# Patient Record
Sex: MALE | Race: WHITE | NOT HISPANIC OR LATINO | URBAN - METROPOLITAN AREA
[De-identification: names, ages, dates, MRNs, and addresses within clinical notes are randomized per-mention and may not be internally consistent; named-entity substitution may affect disease eponyms.]

---
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
## 2023-06-09 ENCOUNTER — OFFICE VISIT (OUTPATIENT)
Dept: GASTROENTEROLOGY | Facility: CLINIC | Age: 18
End: 2023-06-09
Payer: COMMERCIAL

## 2023-06-09 VITALS
HEIGHT: 68 IN | BODY MASS INDEX: 27.19 KG/M2 | WEIGHT: 179.4 LBS | SYSTOLIC BLOOD PRESSURE: 132 MMHG | DIASTOLIC BLOOD PRESSURE: 77 MMHG | HEART RATE: 64 BPM

## 2023-06-09 DIAGNOSIS — R14.0 BLOATING: Primary | ICD-10-CM

## 2023-06-09 PROCEDURE — 99213 OFFICE O/P EST LOW 20 MIN: CPT | Performed by: INTERNAL MEDICINE

## 2023-06-09 NOTE — PROGRESS NOTES
"[facility] - Outpatient Follow-up Note  Makayla Kelsey 21 y o  male MRN: [id number]  Encounter: [id number]          ASSESSMENT AND PLAN:      1  Bloating  Symptoms likely due to small intestinal bacterial overgrowth versus postinfectious IBS  We will evaluate with SIBO breath test and consider trial of Xifaxan either way  Contingency might include further evaluation with CT scan or colonoscopy though these do not seem necessary at present  Discussed low FODMAP diet    - Small intestinal bacterial overgrowth    ______________________________________________________________________    SUBJECTIVE: Patient seen previously in August 2022 with symptoms suggestive of postinfectious IBS  Work-up at that time including fecal calprotectin, TSH, Giardia antigen, pancreatic elastase all normal   Diarrhea has improved but now reports significant abdominal gas and bloating primarily in the morning when first waking and often relieved with defecation  Does not seem to be associated with any particular foods  He has tried probiotics without improvement      REVIEW OF SYSTEMS:    ROS       Historical Information   History reviewed  No pertinent past medical history  Past Surgical History:   Procedure Laterality Date   • CYST REMOVAL      tailbone     Social History   Social History     Substance and Sexual Activity   Alcohol Use Not Currently     Social History     Substance and Sexual Activity   Drug Use Never     Social History     Tobacco Use   Smoking Status Never   Smokeless Tobacco Never     Family History   Family history unknown: Yes       Meds/Allergies     No current outpatient medications on file  No Known Allergies        Objective     Blood pressure 132/77, pulse 64, height 5' 8\" (1 727 m), weight 81 4 kg (179 lb 6 4 oz)  Body mass index is 27 28 kg/m²  PHYSICAL EXAM:      Physical Exam     Lab Results:   No visits with results within 1 Day(s) from this visit     Latest known " visit with results is:   Lab on 08/04/2022   Component Date Value   • Giardia Ag, Stl 08/04/2022 Negative    • Pancreatic Elastase-1 08/04/2022 263    • Calprotectin 08/04/2022 53          Radiology Results:   No results found

## 2023-06-13 ENCOUNTER — TELEPHONE (OUTPATIENT)
Dept: GASTROENTEROLOGY | Facility: CLINIC | Age: 18
End: 2023-06-13

## 2023-06-15 ENCOUNTER — OFFICE VISIT (OUTPATIENT)
Dept: GASTROENTEROLOGY | Facility: CLINIC | Age: 18
End: 2023-06-15
Payer: COMMERCIAL

## 2023-06-15 DIAGNOSIS — R14.0 BLOATING: Primary | ICD-10-CM

## 2023-06-15 PROCEDURE — 91065 BREATH HYDROGEN/METHANE TEST: CPT | Performed by: INTERNAL MEDICINE

## 2023-06-15 NOTE — PROGRESS NOTES
[facility]       Bacterial Overgrowth Analytical Record    Breanna Lainez 21 y o  male MRN: [id number]      Date of Test: 06/13/2023    Substrate Given: Lactulose    Ordering Provider: Dr Amy Cruz Assistant: Ryan Cook    Symptoms: bloating    The patient presents for bacterial overgrowth testing  Patient fasted overnight  Baseline readings obtained  Breath test performed every 20 min for a total of 3 hr    Sample Clock Time ppmH2 ppmCH4 Co2% Oscar   Baseline   07:40 2 3 4 8 1 14   #1  20 minutes 08:00 2 4 4 6 1 19   #2  40 minutes 08:20 3 4 5 2 1 05   #3  60 minutes 08:40 18 7 5 2 1 05   #4  80 minutes 09:00 39 9 5 4 1 01   #5  100 minutes 09:20 48 8 5 2 1 05   #6  120 minutes 09:40 41 9 4 9 1 12   #7  140 minutes 10:10 31 9 5 1 1 07   #8  160 minutes 10:20 37 7 5 2 1 05   #9  180 minutes 10:40 19 7 5 1 1 07       Physician interpretation: Breath test is positive for small intestinal bacterial overgrowth but not intestinal methanogen overgrowth  Will forward to Dr Ewa Clark to discuss next steps with patient

## 2023-07-05 ENCOUNTER — TELEPHONE (OUTPATIENT)
Age: 18
End: 2023-07-05

## 2023-07-05 NOTE — TELEPHONE ENCOUNTER
pts father called to inquire on results of SIBO test. I relayed positive results. They are looking for next steps? Medications?

## 2023-07-07 NOTE — TELEPHONE ENCOUNTER
Pt called again. He is wondering if he needs to start any medications for his positive SIBO test.  Please advise.

## 2023-07-07 NOTE — TELEPHONE ENCOUNTER
Pt called regarding medication he needs to take from the positive SIBO test. Pt would like a call when prescription has been sent over to the pharmacy. Thank you.

## 2023-07-10 ENCOUNTER — TELEPHONE (OUTPATIENT)
Age: 18
End: 2023-07-10

## 2023-07-10 DIAGNOSIS — R19.7 DIARRHEA, UNSPECIFIED TYPE: Primary | ICD-10-CM

## 2023-07-10 NOTE — TELEPHONE ENCOUNTER
Pt is calling, he called multiple times last week and still has not heard back. He wants to know what to do about his positive SIBO results. Messaged on call provider [employer]. Per Alka Lange, Dr. Juan Manuel Luna is out of office. She will review chart. Chart reviewed, Xifaxan sent to [facility]. If price is too expensive, pt can get samples in office. Pt verbalized understanding.

## 2023-07-12 ENCOUNTER — TELEPHONE (OUTPATIENT)
Age: 18
End: 2023-07-12

## 2023-07-12 NOTE — TELEPHONE ENCOUNTER
Pt called in expressing frustration that he has not heard from [facility] about prescription or office staff about samples. Pt has called numerous times about addressing this. Pt would like samples and he stated he would be in the [facility] area today. He requested a call by noon today if he can come in to the office for samples. He lives an hour away and would like to avoid multiple trips. He stated if he does not hear back by noon today, he will come in to the office for samples.

## 2023-07-12 NOTE — TELEPHONE ENCOUNTER
I called patient and left message letting him know xifaxan was sent to pharmacy on 7/10/23 and it is a process and does take some time and we are waiting to get a response from [facility] as well as [facility] will reach out to him too. I did provide the number for [facility] pharmacy so he could reach out to them directly to possibly expedite the process and that we will contact him as soon as we get a response and to c/b with any further questions and concerns.

## 2023-07-14 ENCOUNTER — TELEPHONE (OUTPATIENT)
Dept: GASTROENTEROLOGY | Facility: CLINIC | Age: 18
End: 2023-07-14